# Patient Record
Sex: FEMALE | Race: WHITE | URBAN - METROPOLITAN AREA
[De-identification: names, ages, dates, MRNs, and addresses within clinical notes are randomized per-mention and may not be internally consistent; named-entity substitution may affect disease eponyms.]

---

## 2021-03-08 ENCOUNTER — APPOINTMENT (RX ONLY)
Dept: URBAN - METROPOLITAN AREA CLINIC 52 | Facility: CLINIC | Age: 81
Setting detail: DERMATOLOGY
End: 2021-03-08

## 2021-03-08 VITALS — TEMPERATURE: 96.7 F

## 2021-03-08 DIAGNOSIS — L29.8 OTHER PRURITUS: ICD-10-CM | Status: INADEQUATELY CONTROLLED

## 2021-03-08 DIAGNOSIS — D485 NEOPLASM OF UNCERTAIN BEHAVIOR OF SKIN: ICD-10-CM

## 2021-03-08 DIAGNOSIS — L29.89 OTHER PRURITUS: ICD-10-CM | Status: INADEQUATELY CONTROLLED

## 2021-03-08 PROBLEM — D48.5 NEOPLASM OF UNCERTAIN BEHAVIOR OF SKIN: Status: ACTIVE | Noted: 2021-03-08

## 2021-03-08 PROCEDURE — ? ADDITIONAL NOTES

## 2021-03-08 PROCEDURE — ? COUNSELING

## 2021-03-08 PROCEDURE — ? BIOPSY BY PUNCH METHOD

## 2021-03-08 PROCEDURE — 99204 OFFICE O/P NEW MOD 45 MIN: CPT | Mod: 25

## 2021-03-08 PROCEDURE — ? PRESCRIPTION

## 2021-03-08 PROCEDURE — 11104 PUNCH BX SKIN SINGLE LESION: CPT

## 2021-03-08 RX ORDER — TRIAMCINOLONE ACETONIDE 1 MG/G
1 CREAM TOPICAL BID
Qty: 1 | Refills: 1 | Status: ERX | COMMUNITY
Start: 2021-03-08

## 2021-03-08 RX ORDER — PREDNISONE 5 MG/1
1 TABLET ORAL AS DIRECTED
Qty: 1 | Refills: 0 | Status: ERX | COMMUNITY
Start: 2021-03-08

## 2021-03-08 RX ADMIN — TRIAMCINOLONE ACETONIDE 1: 1 CREAM TOPICAL at 00:00

## 2021-03-08 RX ADMIN — PREDNISONE 1: 5 TABLET ORAL at 00:00

## 2021-03-08 ASSESSMENT — LOCATION SIMPLE DESCRIPTION DERM
LOCATION SIMPLE: RIGHT THIGH
LOCATION SIMPLE: LEFT THIGH
LOCATION SIMPLE: RIGHT UPPER ARM
LOCATION SIMPLE: LEFT UPPER ARM
LOCATION SIMPLE: RIGHT SHOULDER

## 2021-03-08 ASSESSMENT — LOCATION ZONE DERM
LOCATION ZONE: ARM
LOCATION ZONE: LEG

## 2021-03-08 ASSESSMENT — LOCATION DETAILED DESCRIPTION DERM
LOCATION DETAILED: LEFT ANTERIOR PROXIMAL THIGH
LOCATION DETAILED: RIGHT ANTERIOR PROXIMAL UPPER ARM
LOCATION DETAILED: LEFT ANTERIOR PROXIMAL UPPER ARM
LOCATION DETAILED: RIGHT ANTERIOR PROXIMAL THIGH
LOCATION DETAILED: RIGHT ANTERIOR SHOULDER

## 2021-03-22 ENCOUNTER — APPOINTMENT (RX ONLY)
Dept: URBAN - METROPOLITAN AREA CLINIC 52 | Facility: CLINIC | Age: 81
Setting detail: DERMATOLOGY
End: 2021-03-22

## 2021-03-22 VITALS — TEMPERATURE: 98.4 F

## 2021-03-22 DIAGNOSIS — L28.0 LICHEN SIMPLEX CHRONICUS: ICD-10-CM | Status: IMPROVED

## 2021-03-22 DIAGNOSIS — Z48.02 ENCOUNTER FOR REMOVAL OF SUTURES: ICD-10-CM

## 2021-03-22 DIAGNOSIS — L29.8 OTHER PRURITUS: ICD-10-CM | Status: IMPROVED

## 2021-03-22 DIAGNOSIS — L43.2 LICHENOID DRUG REACTION: ICD-10-CM | Status: IMPROVED

## 2021-03-22 DIAGNOSIS — L29.89 OTHER PRURITUS: ICD-10-CM | Status: IMPROVED

## 2021-03-22 PROCEDURE — 99024 POSTOP FOLLOW-UP VISIT: CPT

## 2021-03-22 PROCEDURE — 99213 OFFICE O/P EST LOW 20 MIN: CPT

## 2021-03-22 PROCEDURE — ? COUNSELING

## 2021-03-22 PROCEDURE — ? ADDITIONAL NOTES

## 2021-03-22 PROCEDURE — ? TREATMENT REGIMEN

## 2021-03-22 PROCEDURE — ? ORDER TESTS

## 2021-03-22 PROCEDURE — ? SUTURE REMOVAL (GLOBAL PERIOD)

## 2021-03-22 ASSESSMENT — LOCATION DETAILED DESCRIPTION DERM
LOCATION DETAILED: RIGHT ANTERIOR PROXIMAL THIGH
LOCATION DETAILED: LEFT ANTERIOR DISTAL UPPER ARM
LOCATION DETAILED: RIGHT MEDIAL SUPERIOR CHEST
LOCATION DETAILED: RIGHT ANTERIOR DISTAL UPPER ARM
LOCATION DETAILED: RIGHT ANTERIOR PROXIMAL UPPER ARM
LOCATION DETAILED: LEFT ANTERIOR PROXIMAL THIGH
LOCATION DETAILED: LEFT ANTERIOR PROXIMAL UPPER ARM

## 2021-03-22 ASSESSMENT — LOCATION SIMPLE DESCRIPTION DERM
LOCATION SIMPLE: LEFT UPPER ARM
LOCATION SIMPLE: LEFT THIGH
LOCATION SIMPLE: RIGHT UPPER ARM
LOCATION SIMPLE: RIGHT THIGH
LOCATION SIMPLE: CHEST

## 2021-03-22 ASSESSMENT — LOCATION ZONE DERM
LOCATION ZONE: TRUNK
LOCATION ZONE: LEG
LOCATION ZONE: ARM

## 2021-03-22 NOTE — PROCEDURE: SUTURE REMOVAL (GLOBAL PERIOD)
Detail Level: Simple
Add 84593 Cpt? (Important Note: In 2017 The Use Of 07989 Is Being Tracked By Cms To Determine Future Global Period Reimbursement For Global Periods): yes